# Patient Record
Sex: MALE | Race: WHITE | NOT HISPANIC OR LATINO | Employment: FULL TIME | URBAN - METROPOLITAN AREA
[De-identification: names, ages, dates, MRNs, and addresses within clinical notes are randomized per-mention and may not be internally consistent; named-entity substitution may affect disease eponyms.]

---

## 2021-12-03 DIAGNOSIS — Z31.41 ENCOUNTER FOR FERTILITY TESTING: Primary | ICD-10-CM

## 2021-12-23 ENCOUNTER — LAB (OUTPATIENT)
Dept: LAB | Facility: CLINIC | Age: 26
End: 2021-12-23
Attending: OBSTETRICS & GYNECOLOGY
Payer: COMMERCIAL

## 2021-12-23 DIAGNOSIS — Z31.41 ENCOUNTER FOR FERTILITY TESTING: ICD-10-CM

## 2021-12-23 LAB
ABNORMAL SPERM MORPHOLOGY: 99
ABSTINENCE DAYS: 2 DAYS (ref 2–7)
AGGLUTINATION: NO
ANALYSIS TEMP - CENTIGRADE: 24 CENTIGRADE
COLLECTION METHOD: ABNORMAL
COLLECTION SITE: ABNORMAL
CONSENT TO RELEASE TO PARTNER: YES
DAL- RECEIVED TIME: ABNORMAL
HEAD DEFECT: 99 %
IMMOTILE: 39 %
LIQUEFIED: YES
MIDPIECE DEFECT: 50 %
NON-PROGRESSIVE MOTILITY: 5 %
NORMAL SPERM MORPHOLOGY: 1 % NORMAL FORMS
PROGRESSIVE MOTILITY: 56 %
ROUND CELLS: 1.2 MILLION/ML
SPECIMEN PH: 7.6 PH
SPECIMEN VOLUME: 2.4 ML
SPERM CONCENTRATION: 58 MILLION/ML
TAIL DEFECT: 15 %
TIME OF ANALYSIS: ABNORMAL
TOTAL PROGRESSIVE MOTILE NUMBER: 78 MILLION
TOTAL SPERM NUMBER: 139 MILLION
VISCOUS: NO
VITALITY: ABNORMAL

## 2021-12-23 PROCEDURE — 89322 SEMEN ANAL STRICT CRITERIA: CPT

## 2023-06-28 ENCOUNTER — HOSPITAL ENCOUNTER (EMERGENCY)
Facility: CLINIC | Age: 28
Discharge: HOME | End: 2023-06-28
Attending: EMERGENCY MEDICINE | Admitting: EMERGENCY MEDICINE
Payer: COMMERCIAL

## 2023-06-28 VITALS
DIASTOLIC BLOOD PRESSURE: 73 MMHG | RESPIRATION RATE: 16 BRPM | HEIGHT: 71 IN | BODY MASS INDEX: 36.4 KG/M2 | TEMPERATURE: 96.9 F | WEIGHT: 260 LBS | SYSTOLIC BLOOD PRESSURE: 144 MMHG | OXYGEN SATURATION: 97 % | HEART RATE: 79 BPM

## 2023-06-28 DIAGNOSIS — L73.9 FOLLICULITIS: ICD-10-CM

## 2023-06-28 PROCEDURE — 250N000013 HC RX MED GY IP 250 OP 250 PS 637: Performed by: EMERGENCY MEDICINE

## 2023-06-28 PROCEDURE — 99284 EMERGENCY DEPT VISIT MOD MDM: CPT

## 2023-06-28 RX ORDER — MUPIROCIN 20 MG/G
OINTMENT TOPICAL 3 TIMES DAILY
Qty: 22 G | Refills: 0 | Status: SHIPPED | OUTPATIENT
Start: 2023-06-28

## 2023-06-28 RX ORDER — DOXYCYCLINE 100 MG/1
100 CAPSULE ORAL ONCE
Status: COMPLETED | OUTPATIENT
Start: 2023-06-28 | End: 2023-06-28

## 2023-06-28 RX ORDER — DOXYCYCLINE 100 MG/1
100 CAPSULE ORAL 2 TIMES DAILY
Qty: 20 CAPSULE | Refills: 0 | Status: SHIPPED | OUTPATIENT
Start: 2023-06-28 | End: 2023-07-08

## 2023-06-28 RX ADMIN — DOXYCYCLINE HYCLATE 100 MG: 100 CAPSULE, GELATIN COATED ORAL at 08:51

## 2023-06-28 NOTE — ED PROVIDER NOTES
"  History   Chief Complaint:  Skin Ulcer    The history is provided by the patient.      Dung Krishna is a 28 year old male who presents with a skin ulcer. The patient reports that 3 days ago he had onset of a skin change on his scrotum, and yesterday noticed another spot on the base of his penis. He denies any urinary changes, or weeping from the area.      Independent Historian:   None - Patient Only    Review of External Notes:       Medications:    Albuterol     Past Medical History:    Lipoma of torso   Pilonidal cyst     Past Surgical History:    Foot surgery      Physical Exam     Patient Vitals for the past 24 hrs:   BP Temp Temp src Pulse Resp SpO2 Height Weight   06/28/23 0822 (!) 144/73 96.9  F (36.1  C) Temporal 79 16 97 % 1.803 m (5' 11\") 117.9 kg (260 lb)      Physical Exam  Vitals reviewed.   Cardiovascular:      Rate and Rhythm: Normal rate.   Pulmonary:      Effort: Pulmonary effort is normal.   Genitourinary:     Penis: Normal.       Comments: Scrotum: There is some brownish and reddish discoloration of the scrotal skin no abscess noted consistent with likely cellulitis or folliculitis.  Skin:     General: Skin is warm.      Capillary Refill: Capillary refill takes less than 2 seconds.   Neurological:      Mental Status: He is alert.           Emergency Department Course   Emergency Department Course & Assessments:  Interventions:  Medications   doxycycline hyclate (VIBRAMYCIN) capsule 100 mg (100 mg Oral $Given 6/28/23 0851)      Assessments:  0828 I obtained history and examined the patient as reported above.    Independent Interpretation (X-rays, CTs, rhythm strip):  None    Consultations/Discussion of Management or Tests:  None     Social Determinants of Health affecting care:   None    Disposition:  The patient was discharged to home.     Impression & Plan    Medical Decision Making:  Patient presents with discoloration of the scrotum.  His examination is suspicious for for folliculitis " without abscess or torsion or epididymitis.  Patient is offered empiric antibiotics and follow-up with urology if no improvement.       Diagnosis:    ICD-10-CM    1. Folliculitis  L73.9          Discharge Medications:  New Prescriptions    DOXYCYCLINE HYCLATE (VIBRAMYCIN) 100 MG CAPSULE    Take 1 capsule (100 mg) by mouth 2 times daily for 10 days    MUPIROCIN (BACTROBAN) 2 % EXTERNAL OINTMENT    Apply topically 3 times daily     Scribe Disclosure:  MUSA, Eliezer Burch, am serving as a scribe at 8:28 AM on 6/28/2023 to document services personally performed by Holden Mike MD based on my observations and the provider's statements to me.      6/28/2023   Holden Mike MD Goodman, Brian Samuel, MD  07/08/23 8416

## 2023-06-28 NOTE — ED TRIAGE NOTES
Pt has an elevated lump on scrotum which is now turning brown. Noticed another one developing on the tip of his penis. No drainage.      Triage Assessment     Row Name 06/28/23 0825       Triage Assessment (Adult)    Airway WDL WDL       Respiratory WDL    Respiratory WDL WDL       Skin Circulation/Temperature WDL    Skin Circulation/Temperature WDL --  Painful brown spot on penis       Cardiac WDL    Cardiac WDL WDL

## 2023-06-28 NOTE — DISCHARGE INSTRUCTIONS
Take antibiotics to completion.  Soak in tub with warm water and Epsom salt or warm water distilled vinegar.  Return if rapid increase in redness or swelling or fever greater than 100.4.

## 2023-09-30 ENCOUNTER — HEALTH MAINTENANCE LETTER (OUTPATIENT)
Age: 28
End: 2023-09-30

## 2024-10-02 ENCOUNTER — APPOINTMENT (OUTPATIENT)
Dept: ULTRASOUND IMAGING | Facility: CLINIC | Age: 29
End: 2024-10-02
Attending: EMERGENCY MEDICINE
Payer: COMMERCIAL

## 2024-10-02 ENCOUNTER — HOSPITAL ENCOUNTER (EMERGENCY)
Facility: CLINIC | Age: 29
Discharge: HOME | End: 2024-10-02
Attending: EMERGENCY MEDICINE | Admitting: EMERGENCY MEDICINE
Payer: COMMERCIAL

## 2024-10-02 ENCOUNTER — APPOINTMENT (OUTPATIENT)
Dept: GENERAL RADIOLOGY | Facility: CLINIC | Age: 29
End: 2024-10-02
Attending: EMERGENCY MEDICINE
Payer: COMMERCIAL

## 2024-10-02 VITALS
OXYGEN SATURATION: 98 % | SYSTOLIC BLOOD PRESSURE: 124 MMHG | BODY MASS INDEX: 37.8 KG/M2 | TEMPERATURE: 97.1 F | RESPIRATION RATE: 16 BRPM | HEIGHT: 71 IN | HEART RATE: 81 BPM | DIASTOLIC BLOOD PRESSURE: 75 MMHG | WEIGHT: 270 LBS

## 2024-10-02 DIAGNOSIS — M25.571 ACUTE RIGHT ANKLE PAIN: ICD-10-CM

## 2024-10-02 PROCEDURE — 73610 X-RAY EXAM OF ANKLE: CPT | Mod: RT

## 2024-10-02 PROCEDURE — 93971 EXTREMITY STUDY: CPT | Mod: RT

## 2024-10-02 PROCEDURE — 99284 EMERGENCY DEPT VISIT MOD MDM: CPT | Mod: 25

## 2024-10-02 ASSESSMENT — COLUMBIA-SUICIDE SEVERITY RATING SCALE - C-SSRS
6. HAVE YOU EVER DONE ANYTHING, STARTED TO DO ANYTHING, OR PREPARED TO DO ANYTHING TO END YOUR LIFE?: NO
1. IN THE PAST MONTH, HAVE YOU WISHED YOU WERE DEAD OR WISHED YOU COULD GO TO SLEEP AND NOT WAKE UP?: NO
2. HAVE YOU ACTUALLY HAD ANY THOUGHTS OF KILLING YOURSELF IN THE PAST MONTH?: NO

## 2024-10-02 ASSESSMENT — ACTIVITIES OF DAILY LIVING (ADL)
ADLS_ACUITY_SCORE: 35

## 2024-10-02 NOTE — ED TRIAGE NOTES
Pt presents with Bilateral ankle pain. Pt reports that right is worse then left. Pt has known cartilage injury in left. Pt also has concerns over a hang nail that may be infected.      Triage Assessment (Adult)       Row Name 10/02/24 1001          Triage Assessment    Airway WDL WDL        Respiratory WDL    Respiratory WDL WDL        Skin Circulation/Temperature WDL    Skin Circulation/Temperature WDL WDL        Cardiac WDL    Cardiac WDL WDL        Cognitive/Neuro/Behavioral WDL    Cognitive/Neuro/Behavioral WDL WDL

## 2024-10-02 NOTE — ED PROVIDER NOTES
"  Emergency Department Note      History of Present Illness     Chief Complaint   Ankle Pain      HPI   Dung Krishna is a 29 year old male with history of asthma who presents to the ED for evaluation of ankle pain. Patient presents with bilateral ankle pain, worse in the right ankle with swelling. Dung reports that he has grade 4 cartilage injury and bone spurs which is scheduled for physical therapy and possible surgery. He states the right ankle pain is atraumatic as he has been in the hospital with his son recently. He noticed an ingrown toenail in the right foot a couple of weeks ago and is concerned for possible infection. Endorses pain with ambulation and some pain radiating up the right calf. No vomiting, fever, cough, chest pain, shortness of breath, leg swelling, or history of PE/DVT. He regularly follows up with Orthopedics.       Independent Historian   None    Review of External Notes   none    Past Medical History     Medical History and Problem List   Pilonidal cyst  Varicocele  Lipoma  Asthma     Medications   Levalbuterol     Surgical History   Left foot fracture surgery  [place] teeth extraction     Physical Exam     Patient Vitals for the past 24 hrs:   BP Temp Temp src Pulse Resp SpO2 Height Weight   10/02/24 0944 124/75 -- -- -- -- -- -- --   10/02/24 0943 -- 97.1  F (36.2  C) Temporal 81 16 98 % 1.803 m (5' 11\") 122.5 kg (270 lb)     Physical Exam    Nursing note and vitals reviewed.  Constitutional:  Appears well-developed and well-nourished.   HENT:   Head:    Atraumatic.   Mouth/Throat:   Oropharynx is clear and moist. No oropharyngeal exudate.   Eyes:    Pupils are equal, round, and reactive to light.   Neck:    Normal range of motion. Neck supple.      No tracheal deviation present. No thyromegaly present.   Cardiovascular:  Normal rate, regular rhythm, no murmur   Pulmonary/Chest: Breath sounds are clear and equal without wheezes or crackles.  Abdominal:   Soft. Bowel sounds are " normal. Exhibits no distension and      no mass. There is no tenderness.      There is no rebound and no guarding.   Musculoskeletal:  Right leg exam: foot, ankle, and leg are non tender without any redness or skin discoloration. No warmth or tenderness. Good dorsalis pedis pulse. Sensation intact distally.   Lymphadenopathy:  No cervical adenopathy.   Neurological:   Alert and oriented to person, place, and time.   Skin:    Skin is warm and dry. No rash noted. No pallor.      Diagnostics     Lab Results   Labs Ordered and Resulted from Time of ED Arrival to Time of ED Departure - No data to display    Imaging   US Lower Extremity Venous Duplex Right   Final Result   IMPRESSION: No evidence of deep venous thrombosis.      THERESE BELLE MD            SYSTEM ID:  [id number]      Ankle XR, G/E 3 views, right   Final Result   IMPRESSION:   No acute fracture or subluxation. Joint spaces appear preserved.      J CARLOS CEDILLO MD            SYSTEM ID:  [id number]          Independent Interpretation   X-ray of his right ankle shows no sign of fracture or abnormality    ED Course      Medications Administered   Medications - No data to display    Procedures   Procedures     Discussion of Management   None    ED Course   ED Course as of 10/02/24 1259   Wed Oct 02, 2024   1114 I obtained history and examined the patient as noted above.    1259 I rechecked the patient and explained findings.        Additional Documentation  None    Medical Decision Making / Diagnosis     CMS Diagnoses: None    MIPS       None    [facility]   Dung Krishna is a 29 year old male who arrives to the emergency department due to right ankle pain of unclear etiology.  Is possible that he has the same issue on his right ankle that he has has this chronic issue on his left ankle.  I did not have any concern for septic arthritis.  I was initially concerned about possibly of a DVT since he has not been as active as usual, so ultrasound of his right leg  was obtained and did not show any sign of DVT thankfully.  It is likely that the patient has a mild sprain with some pain of his ankle but there was no sign of fracture, dislocation or infection or DVT at this time so I felt he could be safely discharged home.  He was instructed to follow-up with his orthopedic surgeon in clinic for follow-up and to return if he develops any redness, fever, increasing swelling or pain or other concerning symptoms.    Disposition   The patient was discharged.     Diagnosis     ICD-10-CM    1. Acute right ankle pain  M25.571            Discharge Medications   New Prescriptions    No medications on file         Scribe Disclosure:  MUSA, Isabel Justuscortez, am serving as a scribe at 11:20 AM on 10/2/2024 to document services personally performed by Muriel Echevarria MD based on my observations and the provider's statements to me.        Muriel Echevarria MD  10/02/24 5908

## 2024-11-17 ENCOUNTER — HEALTH MAINTENANCE LETTER (OUTPATIENT)
Age: 29
End: 2024-11-17